# Patient Record
Sex: MALE | Race: BLACK OR AFRICAN AMERICAN
[De-identification: names, ages, dates, MRNs, and addresses within clinical notes are randomized per-mention and may not be internally consistent; named-entity substitution may affect disease eponyms.]

---

## 2020-01-27 ENCOUNTER — HOSPITAL ENCOUNTER (EMERGENCY)
Dept: HOSPITAL 35 - ER | Age: 30
Discharge: HOME | End: 2020-01-27
Payer: COMMERCIAL

## 2020-01-27 VITALS — BODY MASS INDEX: 21.92 KG/M2 | HEIGHT: 76 IN | WEIGHT: 180.01 LBS

## 2020-01-27 VITALS — DIASTOLIC BLOOD PRESSURE: 72 MMHG | SYSTOLIC BLOOD PRESSURE: 102 MMHG

## 2020-01-27 DIAGNOSIS — S39.012A: Primary | ICD-10-CM
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
## 2020-01-27 DIAGNOSIS — S29.012A: ICD-10-CM
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
## 2021-06-02 ENCOUNTER — HOSPITAL ENCOUNTER (INPATIENT)
Dept: HOSPITAL 35 - ER | Age: 31
LOS: 2 days | Discharge: HOME | DRG: 176 | End: 2021-06-04
Attending: HOSPITALIST | Admitting: HOSPITALIST
Payer: COMMERCIAL

## 2021-06-02 VITALS — DIASTOLIC BLOOD PRESSURE: 59 MMHG | SYSTOLIC BLOOD PRESSURE: 106 MMHG

## 2021-06-02 VITALS — WEIGHT: 164.2 LBS | BODY MASS INDEX: 20.42 KG/M2 | HEIGHT: 75 IN

## 2021-06-02 DIAGNOSIS — Z83.3: ICD-10-CM

## 2021-06-02 DIAGNOSIS — F17.210: ICD-10-CM

## 2021-06-02 DIAGNOSIS — Z71.6: ICD-10-CM

## 2021-06-02 DIAGNOSIS — I26.99: Primary | ICD-10-CM

## 2021-06-02 DIAGNOSIS — Z88.8: ICD-10-CM

## 2021-06-02 PROCEDURE — 10879: CPT

## 2021-06-03 VITALS — DIASTOLIC BLOOD PRESSURE: 60 MMHG | SYSTOLIC BLOOD PRESSURE: 104 MMHG

## 2021-06-03 VITALS — SYSTOLIC BLOOD PRESSURE: 106 MMHG | DIASTOLIC BLOOD PRESSURE: 59 MMHG

## 2021-06-03 VITALS — DIASTOLIC BLOOD PRESSURE: 66 MMHG | SYSTOLIC BLOOD PRESSURE: 110 MMHG

## 2021-06-03 VITALS — DIASTOLIC BLOOD PRESSURE: 62 MMHG | SYSTOLIC BLOOD PRESSURE: 97 MMHG

## 2021-06-03 LAB
ALBUMIN SERPL-MCNC: 4.2 G/DL (ref 3.4–5)
ALT SERPL-CCNC: 14 U/L (ref 16–63)
ANION GAP SERPL CALC-SCNC: 9 MMOL/L (ref 7–16)
APTT BLD: 24.2 SECONDS (ref 24.5–32.8)
AST SERPL-CCNC: 14 U/L (ref 15–37)
BASOPHILS NFR BLD AUTO: 0.1 % (ref 0–2)
BILIRUB SERPL-MCNC: 0.2 MG/DL (ref 0.2–1)
BUN SERPL-MCNC: 17 MG/DL (ref 7–18)
CALCIUM SERPL-MCNC: 9.1 MG/DL (ref 8.5–10.1)
CHLORIDE SERPL-SCNC: 106 MMOL/L (ref 98–107)
CO2 SERPL-SCNC: 28 MMOL/L (ref 21–32)
CREAT SERPL-MCNC: 0.9 MG/DL (ref 0.7–1.3)
EOSINOPHIL NFR BLD: 3.9 % (ref 0–3)
ERYTHROCYTE [DISTWIDTH] IN BLOOD BY AUTOMATED COUNT: 13.1 % (ref 10.5–14.5)
GLUCOSE SERPL-MCNC: 116 MG/DL (ref 74–106)
GRANULOCYTES NFR BLD MANUAL: 56.7 % (ref 36–66)
HCT VFR BLD CALC: 36.2 % (ref 42–52)
HGB BLD-MCNC: 12.3 GM/DL (ref 14–18)
INR PPP: 1
LIPASE: 109 U/L (ref 73–393)
LYMPHOCYTES NFR BLD AUTO: 30.9 % (ref 24–44)
MCH RBC QN AUTO: 30.2 PG (ref 26–34)
MCHC RBC AUTO-ENTMCNC: 33.9 G/DL (ref 28–37)
MCV RBC: 88.9 FL (ref 80–100)
MONOCYTES NFR BLD: 8.4 % (ref 1–8)
NEUTROPHILS # BLD: 2.7 THOU/UL (ref 1.4–8.2)
PLATELET # BLD: 204 THOU/UL (ref 150–400)
POTASSIUM SERPL-SCNC: 4.4 MMOL/L (ref 3.5–5.1)
PROT SERPL-MCNC: 7 G/DL (ref 6.4–8.2)
PROTHROMBIN TIME: 10.9 SECONDS (ref 10.5–12.1)
RBC # BLD AUTO: 4.07 MIL/UL (ref 4.5–6)
SODIUM SERPL-SCNC: 143 MMOL/L (ref 136–145)
TROPONIN I SERPL-MCNC: <0.06 NG/ML (ref ?–0.06)
WBC # BLD AUTO: 4.9 THOU/UL (ref 4–11)

## 2021-06-03 NOTE — EKG
[facility]
[address]
[place]  [address]
Phone:  [phone]                    ELECTROCARDIOGRAM REPORT      
_______________________________________________________________________________
 
Name:       ROBERT MENG         Room #:         170-2       ADM IN  
M.R.#:      [id number]     Account #:      [id number]  
Admission:  21    Attend Phys:    Mohan Lopez MD     
Discharge:              Date of Birth:  [birth year]  
                                                          Report #: [id number]
   [id number]
_______________________________________________________________________________
                         [facility] ED
                                       
Test Date:    2021               Test Time:    23:58:31
Pat Name:     ROBERT MENG          Department:   
Patient ID:   [id number]            Room:         170
Gender:       M                        Technician:   TIMOTEO
:          1990               Requested By: Anthony Beach
Order Number: [id number] MD:   Jagdish Cosby
                                 Measurements
Intervals                              Axis          
Rate:         67                       P:            76
WY:           142                      QRS:          69
QRSD:         89                       T:            58
QT:           363                                    
QTc:          383                                    
                           Interpretive Statements
Sinus rhythm
LVH by voltage
J Point elev, probable normal early repol pattern
Baseline wander in lead(s) V5
No previous ECG available for comparison
Electronically Signed On 6-3-2021 7:14:30 CDT by Jagdish Cosby
[email or web address]
 
 
 
 
 
 
 
 
 
 
 
 
 
 
 
 
 
 
 
 
  <ELECTRONICALLY SIGNED>
   By: Jagdish Cosby MD, [facility]    
  21     0714
D: 21 2358                           _____________________________________
T: 215                           Jagdish Cosby MD, FACC      /EPI

## 2021-06-04 VITALS — DIASTOLIC BLOOD PRESSURE: 59 MMHG | SYSTOLIC BLOOD PRESSURE: 107 MMHG

## 2021-06-04 VITALS — DIASTOLIC BLOOD PRESSURE: 58 MMHG | SYSTOLIC BLOOD PRESSURE: 100 MMHG

## 2021-06-04 LAB
ANION GAP SERPL CALC-SCNC: 10 MMOL/L (ref 7–16)
BUN SERPL-MCNC: 13 MG/DL (ref 7–18)
CALCIUM SERPL-MCNC: 8.9 MG/DL (ref 8.5–10.1)
CHLORIDE SERPL-SCNC: 104 MMOL/L (ref 98–107)
CO2 SERPL-SCNC: 26 MMOL/L (ref 21–32)
CREAT SERPL-MCNC: 0.9 MG/DL (ref 0.7–1.3)
ERYTHROCYTE [DISTWIDTH] IN BLOOD BY AUTOMATED COUNT: 13.2 % (ref 10.5–14.5)
GLUCOSE SERPL-MCNC: 90 MG/DL (ref 74–106)
HCT VFR BLD CALC: 39.2 % (ref 42–52)
HGB BLD-MCNC: 13.1 GM/DL (ref 14–18)
MCH RBC QN AUTO: 29.8 PG (ref 26–34)
MCHC RBC AUTO-ENTMCNC: 33.5 G/DL (ref 28–37)
MCV RBC: 88.9 FL (ref 80–100)
PLATELET # BLD: 211 THOU/UL (ref 150–400)
POTASSIUM SERPL-SCNC: 4.3 MMOL/L (ref 3.5–5.1)
RBC # BLD AUTO: 4.41 MIL/UL (ref 4.5–6)
SODIUM SERPL-SCNC: 140 MMOL/L (ref 136–145)
WBC # BLD AUTO: 4.7 THOU/UL (ref 4–11)

## 2021-06-05 ENCOUNTER — HOSPITAL ENCOUNTER (EMERGENCY)
Dept: HOSPITAL 35 - ER | Age: 31
Discharge: HOME | End: 2021-06-05
Payer: COMMERCIAL

## 2021-06-05 VITALS — HEIGHT: 75 IN | BODY MASS INDEX: 20.39 KG/M2 | WEIGHT: 164 LBS

## 2021-06-05 VITALS — SYSTOLIC BLOOD PRESSURE: 113 MMHG | DIASTOLIC BLOOD PRESSURE: 74 MMHG

## 2021-06-05 DIAGNOSIS — Z88.6: ICD-10-CM

## 2021-06-05 DIAGNOSIS — R63.0: ICD-10-CM

## 2021-06-05 DIAGNOSIS — I26.99: Primary | ICD-10-CM

## 2021-06-06 NOTE — EKG
[place] [address]
[place]  [address]
Phone:  [phone]                    ELECTROCARDIOGRAM REPORT      
_______________________________________________________________________________
 
Name:       ROBERT MENG         [id number]     Account #:      [id number]  
Admission:  21    Attend Phys:                          
Discharge:  21    Date of Birth:  [birth year]  
                                                          Report #: [id number]
   [id number]
_______________________________________________________________________________
                         [facility] ED
                                       
Test Date:    2021               Test Time:    12:57:51
Pat Name:     ROBERT MENG          Department:   
Patient ID:   [id number]            Room:          
Gender:       M                        Technician:   
:          1990               Requested By: Danya Middleton
Order Number: [id number] MD:   Ed Denny
                                 Measurements
Intervals                              Axis          
Rate:         69                       P:            79
FL:           130                      QRS:          75
QRSD:         84                       T:            59
QT:           360                                    
QTc:          386                                    
                           Interpretive Statements
Sinus rhythm
ST elev, probable normal early repol pattern
Compared to ECG 2021 23:58:31
No significant change was found
Electronically Signed On 2021 11:34:38 CDT by Ed Denny
[email or web address]
 
 
 
 
 
 
 
 
 
 
 
 
 
 
 
 
 
 
 
 
 
  <ELECTRONICALLY SIGNED>
   By: dE Denny MD, [facility]   
  21     1134
D: 21 1257                           _____________________________________
T: 21 1257                           Ed Denny MD, FACC     /EPI

## 2021-07-26 ENCOUNTER — HOSPITAL ENCOUNTER (OUTPATIENT)
Dept: HOSPITAL 35 - CV | Age: 31
End: 2021-07-26
Attending: INTERNAL MEDICINE
Payer: COMMERCIAL

## 2021-07-26 DIAGNOSIS — R55: ICD-10-CM

## 2021-07-26 DIAGNOSIS — R06.00: ICD-10-CM

## 2021-07-26 DIAGNOSIS — I27.82: Primary | ICD-10-CM

## 2021-07-26 NOTE — 2DMMODE
[facility]
[place]   [address]                   2 D/M-MODE ECHOCARDIOGRAM     
_______________________________________________________________________________
 
Name:       ROBERT MENG         Room #:                     REG IVETTE DRUMMOND.#:      [id number]                       Account #:      [id number]  
Admission:  07/26/21    Attend Phys:    Aldo Crespo MD   
Discharge:              Date of Birth:  [birth year]  
                                                          Report #: [id number]
                                                                    [id number]
_______________________________________________________________________________
THIS REPORT FOR:  
 
cc:  Santi Dumont James A. DO Park, Jin S. MD                                                   
                                                                       ~
 
--------------- APPROVED REPORT --------------
 
 
Study performed:  07/26/2021 12:26:25
 
EXAM: Comprehensive 2D, Doppler, and color-flow 
Echocardiogram 
Patient Location: Out-Patient   
Room #:  2     Status:  routine
 
      BSA:         2.00
HR: 61 bpm BP:          118/62 mmHg 
Rhythm: NSR     
 
Other Information 
Study Quality: Excellent
 
Indications
Pulmonary Embolism
Dyspnea 
 
2D Dimensions
RVDd:  38.70 mm   
IVSd:  9.40 (7-11mm)  LVOT Diam:  23.56 (18-24mm) 
LVDd:  52.81 mm   
PWd:  7.39 (7-11mm)  Ascending Ao:  31.27 (22-36mm)
LVDs:  36.64 (25-40mm)  
Left Atrium:  31.46 (27-40mm) 
Aortic Root:  34.61 mm  IVC:  17.00 mm
 
Volumes
Left Atrial Volume (Systole) 
Single Plane 4CH:  38.60 mL Single Plane 2CH:  49.94 mL
    LA ESV Index:  26.00 mL/m2
 
Aortic Valve
AoV Peak Syd.:  1.01 m/s 
AO Peak Gr.:  4.12 mmHg  LVOT Max PG:  3.25 mmHg
    LVOT Max V:  0.90 m/s
 
 
[facility]
[email or web address]
[place]  [address]
Phone:  [phone] 2 D/M-MODE ECHOCARDIOGRAM     
_______________________________________________________________________________
 
Name:            ROBERT MENG         Room #:                    REG CL
[facility]#:           [id number]          Account #:     [id number]  
Admission:       07/26/21         Attend Phys:   LUIS Reyes
Discharge:                  Date of Birth: [birth year]  
                         Report #:      [id number]
        [id number]
_______________________________________________________________________________
LORETTA Vmax: 3.87 cm2  
 
Mitral Valve
    E/A Ratio:  1.3
    MV Decel. Time:  173.37 ms
MV E Max Syd.:  0.77 m/s 
MV A Syd.:  0.58 m/s  
MV PHT:  50.28 ms  
IVRT:  110.73 ms  
 
Pulmonary Valve
PV Peak Syd.:  0.93 m/s PV Peak Gr.:  3.45 mmHg
 
Pulmonary Vein
P Vein S:    0.46 m/s P Vein A:  0.18 m/s
P Vein D:   0.62 m/s P Vein A Dur.:  110.7 msec
P Vein S/D Ratio:  0.74 
 
Left Ventricle
The left ventricle is normal size. There is normal LV segmental wall 
motion. There is normal left ventricular wall thickness. Left 
ventricular systolic function is normal. The left ventricular 
ejection fraction is within the normal range. LVEF is 60%. The left 
ventricular diastolic function is normal.
 
Right Ventricle
The right ventricle is normal size. The right ventricular systolic 
function is normal.
 
Atria
The left atrium size is normal. The right atrium size is 
normal.
 
Aortic Valve
The aortic valve is normal in structure. No aortic regurgitation is 
present. There is no aortic valvular stenosis.
 
Mitral Valve
The mitral valve is normal in structure. There is no mitral valve 
regurgitation noted. No evidence of mitral valve stenosis.
 
Tricuspid Valve
The tricuspid valve is normal in structure. There is no tricuspid 
valve regurgitation noted.
 
Pulmonic Valve
 
 
[facility]
[address] [place]  [address]
Phone:  [phone]                    2 D/M-MODE ECHOCARDIOGRAM     
_______________________________________________________________________________
 
Name:            ROBERT MENG         Room #:                    REG CL
DAW[facility].#:           [id number]          Account #:     [id number]  
Admission:       07/26/21         Attend Phys:   LUIS Reyes
Discharge:                  Date of Birth: [birth year]  
                         Report #:      [id number]
        [id number]
_______________________________________________________________________________
The pulmonary valve is normal in structure. There is no pulmonic 
valvular regurgitation.
 
Great Vessels
The aortic root is normal in size. IVC is normal in size and 
collapses >50% with inspiration.
 
Pericardium
There is no pericardial effusion.
 
<Conclusion>
The left ventricle is normal size.
There is normal left ventricular wall thickness.
Left ventricular systolic function is normal.
The right ventricle is normal size.
The left atrium size is normal.
The aortic valve is normal in structure.
There is no mitral valve regurgitation noted.
 
 
 
 
 
 
 
 
 
 
 
 
 
 
 
 
 
 
 
 
 
 
 
 
 
 
  <ELECTRONICALLY SIGNED>
   By: Ambrose Streeter MD               
  07/26/21     1444
D: 07/26/21 1444                           _____________________________________
T: 07/26/21 1444                           Ambrose Streeter MD                 /INF

## 2021-08-08 ENCOUNTER — HOSPITAL ENCOUNTER (EMERGENCY)
Dept: HOSPITAL 35 - ER | Age: 31
Discharge: HOME | End: 2021-08-08
Payer: COMMERCIAL

## 2021-08-08 VITALS — BODY MASS INDEX: 21.39 KG/M2 | HEIGHT: 75 IN | WEIGHT: 172 LBS

## 2021-08-08 VITALS — DIASTOLIC BLOOD PRESSURE: 71 MMHG | SYSTOLIC BLOOD PRESSURE: 115 MMHG

## 2021-08-08 DIAGNOSIS — S39.012A: Primary | ICD-10-CM

## 2021-08-08 DIAGNOSIS — Z88.6: ICD-10-CM
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
## 2021-08-10 ENCOUNTER — HOSPITAL ENCOUNTER (EMERGENCY)
Dept: HOSPITAL 35 - ER | Age: 31
LOS: 1 days | Discharge: HOME | End: 2021-08-11
Payer: COMMERCIAL

## 2021-08-10 VITALS — BODY MASS INDEX: 21.64 KG/M2 | HEIGHT: 75 IN | WEIGHT: 174.01 LBS

## 2021-08-10 DIAGNOSIS — R51.9: ICD-10-CM

## 2021-08-10 DIAGNOSIS — Z79.891: ICD-10-CM

## 2021-08-10 DIAGNOSIS — U07.1: Primary | ICD-10-CM
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
## 2021-08-10 DIAGNOSIS — Z88.8: ICD-10-CM
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
## 2021-08-10 DIAGNOSIS — Z79.899: ICD-10-CM

## 2021-08-11 VITALS — SYSTOLIC BLOOD PRESSURE: 101 MMHG | DIASTOLIC BLOOD PRESSURE: 54 MMHG

## 2021-08-11 LAB
ANION GAP SERPL CALC-SCNC: 9 MMOL/L (ref 7–16)
APTT BLD: 33.9 SECONDS (ref 24.5–32.8)
BUN SERPL-MCNC: 9 MG/DL (ref 7–18)
CALCIUM SERPL-MCNC: 8.7 MG/DL (ref 8.5–10.1)
CHLORIDE SERPL-SCNC: 101 MMOL/L (ref 98–107)
CO2 SERPL-SCNC: 30 MMOL/L (ref 21–32)
CREAT SERPL-MCNC: 1 MG/DL (ref 0.7–1.3)
ERYTHROCYTE [DISTWIDTH] IN BLOOD BY AUTOMATED COUNT: 13.2 % (ref 10.5–14.5)
GLUCOSE SERPL-MCNC: 96 MG/DL (ref 74–106)
GRANULOCYTES NFR BLD MANUAL: 58 % (ref 36–66)
HCT VFR BLD CALC: 40.8 % (ref 42–52)
HGB BLD-MCNC: 13.5 GM/DL (ref 14–18)
INR PPP: 1.13
LYMPHOCYTES NFR BLD AUTO: 16 % (ref 24–44)
MCH RBC QN AUTO: 29.5 PG (ref 26–34)
MCHC RBC AUTO-ENTMCNC: 33 G/DL (ref 28–37)
MCV RBC: 89.6 FL (ref 80–100)
MONOCYTES NFR BLD: 22 % (ref 1–8)
NEUTROPHILS # BLD: 2.6 THOU/UL (ref 1.4–8.2)
NEUTS BAND NFR BLD: 4 % (ref 0–8)
PLATELET # BLD EST: NORMAL 10*3/UL
PLATELET # BLD: 165 THOU/UL (ref 150–400)
POTASSIUM SERPL-SCNC: 3.8 MMOL/L (ref 3.5–5.1)
PROTHROMBIN TIME: 12.2 SECONDS (ref 10.5–12.1)
RBC # BLD AUTO: 4.55 MIL/UL (ref 4.5–6)
RBC MORPH BLD: NORMAL
SODIUM SERPL-SCNC: 140 MMOL/L (ref 136–145)
WBC # BLD AUTO: 4.2 THOU/UL (ref 4–11)

## 2021-09-02 ENCOUNTER — HOSPITAL ENCOUNTER (OUTPATIENT)
Dept: HOSPITAL 35 - RAD | Age: 31
End: 2021-09-02
Attending: NURSE PRACTITIONER
Payer: COMMERCIAL
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
## 2022-02-22 ENCOUNTER — HOSPITAL ENCOUNTER (EMERGENCY)
Dept: HOSPITAL 35 - ER | Age: 32
Discharge: HOME | End: 2022-02-22
Payer: COMMERCIAL

## 2022-02-22 VITALS — BODY MASS INDEX: 22.38 KG/M2 | HEIGHT: 75 IN | WEIGHT: 180.01 LBS

## 2022-02-22 VITALS — DIASTOLIC BLOOD PRESSURE: 67 MMHG | SYSTOLIC BLOOD PRESSURE: 105 MMHG

## 2022-02-22 DIAGNOSIS — R07.89: Primary | ICD-10-CM

## 2022-02-22 DIAGNOSIS — Z88.8: ICD-10-CM

## 2022-02-22 LAB
ALBUMIN SERPL-MCNC: 4.1 G/DL (ref 3.4–5)
ALT SERPL-CCNC: 26 U/L (ref 16–63)
ANION GAP SERPL CALC-SCNC: 3 MMOL/L (ref 7–16)
AST SERPL-CCNC: 21 U/L (ref 15–37)
BILIRUB SERPL-MCNC: 0.3 MG/DL (ref 0.2–1)
BUN SERPL-MCNC: 19 MG/DL (ref 7–18)
CALCIUM SERPL-MCNC: 9 MG/DL (ref 8.5–10.1)
CHLORIDE SERPL-SCNC: 102 MMOL/L (ref 98–107)
CO2 SERPL-SCNC: 28 MMOL/L (ref 21–32)
CREAT SERPL-MCNC: 1.2 MG/DL (ref 0.7–1.3)
ERYTHROCYTE [DISTWIDTH] IN BLOOD BY AUTOMATED COUNT: 13 % (ref 10.5–14.5)
GLUCOSE SERPL-MCNC: 80 MG/DL (ref 74–106)
HCT VFR BLD CALC: 41.1 % (ref 42–52)
HGB BLD-MCNC: 13.6 GM/DL (ref 14–18)
MCH RBC QN AUTO: 29.4 PG (ref 26–34)
MCHC RBC AUTO-ENTMCNC: 33.1 G/DL (ref 28–37)
MCV RBC: 88.8 FL (ref 80–100)
PLATELET # BLD: 208 THOU/UL (ref 150–400)
POTASSIUM SERPL-SCNC: 4.1 MMOL/L (ref 3.5–5.1)
PROT SERPL-MCNC: 7.1 G/DL (ref 6.4–8.2)
RBC # BLD AUTO: 4.63 MIL/UL (ref 4.5–6)
SODIUM SERPL-SCNC: 133 MMOL/L (ref 136–145)
WBC # BLD AUTO: 4.7 THOU/UL (ref 4–11)

## 2022-02-22 NOTE — EKG
[address] [place]  [address]
Phone:  [phone]                    ELECTROCARDIOGRAM REPORT      
_______________________________________________________________________________
 
Name:       ROBERT MENG         [id number]     Account #:      [id number]  
Admission:  22    Attend Phys:                          
Discharge:              Date of Birth:  [birth year]  
                                                          Report #: [id number]
   [id number]
_______________________________________________________________________________
                         [facility] ED
                                       
Test Date:    2022               Test Time:    14:57:43
Pat Name:     ROBERT MENG          Department:   
Patient ID:   [id number]            Room:          
Gender:       M                        Technician:   SARAH
:          1990               Requested By: Eb Kelly
Order Number: [id number] MD:   Ed Denny
                                 Measurements
Intervals                              Axis          
Rate:         72                       P:            70
WY:           138                      QRS:          65
QRSD:         84                       T:            57
QT:           350                                    
QTc:          383                                    
                           Interpretive Statements
Sinus rhythm
Normal tracing
Compared to ECG 2021 12:57:51
No significant change was found
Electronically Signed On 2022 16:26:32 CST by Ed Denny
[email or web address]
 
 
 
 
 
 
 
 
 
 
 
 
 
 
 
 
 
 
 
 
 
  <ELECTRONICALLY SIGNED>
   By: Ed Denny MD, [facility]   
  22     1626
D: 22 1457                           _____________________________________
T: 22 1457                           Ed Denny MD, FACC     /EPI